# Patient Record
Sex: FEMALE
[De-identification: names, ages, dates, MRNs, and addresses within clinical notes are randomized per-mention and may not be internally consistent; named-entity substitution may affect disease eponyms.]

---

## 2020-07-06 ENCOUNTER — RX ONLY (OUTPATIENT)
Age: 30
Setting detail: RX ONLY
End: 2020-07-06

## 2020-07-06 RX ORDER — HYDROCORTISONE 25 MG/G
OINTMENT TOPICAL
Qty: 1 | Refills: 1 | Status: CANCELLED
Stop reason: CLARIF

## 2024-02-23 ENCOUNTER — HOSPITAL ENCOUNTER (EMERGENCY)
Facility: HOSPITAL | Age: 34
Discharge: HOME | End: 2024-02-23
Attending: EMERGENCY MEDICINE
Payer: COMMERCIAL

## 2024-02-23 VITALS
DIASTOLIC BLOOD PRESSURE: 79 MMHG | HEART RATE: 91 BPM | WEIGHT: 130 LBS | SYSTOLIC BLOOD PRESSURE: 122 MMHG | TEMPERATURE: 98.2 F | OXYGEN SATURATION: 99 % | RESPIRATION RATE: 16 BRPM | HEIGHT: 66 IN | BODY MASS INDEX: 20.89 KG/M2

## 2024-02-23 DIAGNOSIS — R10.9 FLANK PAIN: Primary | ICD-10-CM

## 2024-02-23 LAB
ANION GAP SERPL CALC-SCNC: 6 MEQ/L (ref 3–15)
B-HCG UR QL: NEGATIVE
BASOPHILS # BLD: 0.02 K/UL (ref 0.01–0.1)
BASOPHILS NFR BLD: 0.3 %
BILIRUB UR QL STRIP.AUTO: NEGATIVE MG/DL
BUN SERPL-MCNC: 10 MG/DL (ref 7–25)
CALCIUM SERPL-MCNC: 9.2 MG/DL (ref 8.6–10.3)
CHLORIDE SERPL-SCNC: 103 MEQ/L (ref 98–107)
CLARITY UR REFRACT.AUTO: CLEAR
CO2 SERPL-SCNC: 25 MEQ/L (ref 21–31)
COLOR UR AUTO: COLORLESS
CREAT SERPL-MCNC: 0.8 MG/DL (ref 0.6–1.2)
DIFFERENTIAL METHOD BLD: ABNORMAL
EGFRCR SERPLBLD CKD-EPI 2021: >60 ML/MIN/1.73M*2
EOSINOPHIL # BLD: 0.04 K/UL (ref 0.04–0.36)
EOSINOPHIL NFR BLD: 0.6 %
ERYTHROCYTE [DISTWIDTH] IN BLOOD BY AUTOMATED COUNT: 12.5 % (ref 11.7–14.4)
GLUCOSE SERPL-MCNC: 90 MG/DL (ref 70–99)
GLUCOSE UR STRIP.AUTO-MCNC: NEGATIVE MG/DL
HCT VFR BLD AUTO: 37.2 % (ref 35–45)
HGB BLD-MCNC: 12.5 G/DL (ref 11.8–15.7)
HGB UR QL STRIP.AUTO: NEGATIVE
IMM GRANULOCYTES # BLD AUTO: 0.02 K/UL (ref 0–0.08)
IMM GRANULOCYTES NFR BLD AUTO: 0.3 %
KETONES UR STRIP.AUTO-MCNC: NEGATIVE MG/DL
LEUKOCYTE ESTERASE UR QL STRIP.AUTO: NEGATIVE
LYMPHOCYTES # BLD: 1.2 K/UL (ref 1.2–3.5)
LYMPHOCYTES NFR BLD: 17.3 %
MCH RBC QN AUTO: 29.8 PG (ref 28–33.2)
MCHC RBC AUTO-ENTMCNC: 33.6 G/DL (ref 32.2–35.5)
MCV RBC AUTO: 88.8 FL (ref 83–98)
MONOCYTES # BLD: 0.64 K/UL (ref 0.28–0.8)
MONOCYTES NFR BLD: 9.2 %
NEUTROPHILS # BLD: 5.03 K/UL (ref 1.7–7)
NEUTS SEG NFR BLD: 72.3 %
NITRITE UR QL STRIP.AUTO: NEGATIVE
NRBC BLD-RTO: 0 %
PDW BLD AUTO: 9.3 FL (ref 9.4–12.3)
PH UR STRIP.AUTO: 6 [PH]
PLATELET # BLD AUTO: 224 K/UL (ref 150–369)
POCT TEST: NORMAL
POTASSIUM SERPL-SCNC: 3.9 MEQ/L (ref 3.5–5.1)
PROT UR QL STRIP.AUTO: NEGATIVE
RBC # BLD AUTO: 4.19 M/UL (ref 3.93–5.22)
SODIUM SERPL-SCNC: 134 MEQ/L (ref 136–145)
SP GR UR REFRACT.AUTO: 1.01
UROBILINOGEN UR STRIP-ACNC: 0.2 EU/DL
WBC # BLD AUTO: 6.95 K/UL (ref 3.8–10.5)

## 2024-02-23 PROCEDURE — 81003 URINALYSIS AUTO W/O SCOPE: CPT | Performed by: EMERGENCY MEDICINE

## 2024-02-23 PROCEDURE — 36415 COLL VENOUS BLD VENIPUNCTURE: CPT | Performed by: PHYSICIAN ASSISTANT

## 2024-02-23 PROCEDURE — 80048 BASIC METABOLIC PNL TOTAL CA: CPT | Performed by: PHYSICIAN ASSISTANT

## 2024-02-23 PROCEDURE — 81003 URINALYSIS AUTO W/O SCOPE: CPT

## 2024-02-23 PROCEDURE — 85025 COMPLETE CBC W/AUTO DIFF WBC: CPT | Performed by: PHYSICIAN ASSISTANT

## 2024-02-23 PROCEDURE — 99283 EMERGENCY DEPT VISIT LOW MDM: CPT | Mod: U5

## 2024-02-23 NOTE — ED PROVIDER NOTES
"Emergency Medicine Note  HPI   HISTORY OF PRESENT ILLNESS     Prior records reviewed from [facility]. History obtained by the patient     Ms. Parson is a 33 y.o. female who presents to the ER with a chief complaint of right flank pain x 1 day.  Patient states that proximately a week ago she was prescribed a course of antibiotics after complaining of urinary frequency to her PCP.  Patient states that 2 days ago she began to experience fever chills and diaphoresis throughout the night with urinary frequency.  Patient states that the next day she felt \"much better\".  Patient states that the pain was transient and felt as if she \"just could not get comfortable to go to sleep\".  Patient states that today she was completely asymptomatic however earlier in the day she experienced a tenderness episode of \"cramping\" sensation present in her right flank region.  Patient is currently asymptomatic.    Patient denies hematuria, dysuria, decreased appetite, nausea, vomiting or diarrhea.              Patient History   PAST HISTORY     Reviewed from Nursing Triage:       No past medical history on file.    No past surgical history on file.    No family history on file.           Review of Systems   REVIEW OF SYSTEMS     Review of Systems Pertinent ROS reviewed in HPI, otherwise negative.        VITALS     ED Vitals    Date/Time Temp Pulse Resp BP SpO2 [facility]   02/23/24 2022 -- 91 16 122/79 99 % NKL   02/23/24 1759 36.8 °C (98.2 °F) 100 17 128/82 99 % NN        Pulse Ox %: 99 % (02/23/24 1832)  Pulse Ox Interpretation: Normal (02/23/24 1832)  Heart Rate: 100 (02/23/24 1832)        Physical Exam   PHYSICAL EXAM     Physical Exam  Vitals and nursing note reviewed.   Constitutional:       Appearance: She is well-developed.   HENT:      Head: Normocephalic and atraumatic.   Eyes:      Conjunctiva/sclera: Conjunctivae normal.   Cardiovascular:      Rate and Rhythm: Normal rate and regular rhythm.   Pulmonary:      Effort: Pulmonary effort is " normal.      Breath sounds: Normal breath sounds.   Abdominal:      General: There is no distension.      Palpations: Abdomen is soft. There is no mass.      Tenderness: There is no abdominal tenderness.   Musculoskeletal:         General: No tenderness or deformity. Normal range of motion.      Cervical back: Normal range of motion.   Skin:     General: Skin is warm and dry.   Neurological:      Mental Status: She is alert. Mental status is at baseline.   Psychiatric:         Behavior: Behavior normal.           PROCEDURES     Procedures     DATA     Results     Procedure Component Value Units Date/Time    Basic metabolic panel [[id number]]  (Abnormal) Collected: 02/23/24 1855    Specimen: Blood, Venous Updated: 02/23/24 1959     Sodium 134 mEQ/L      Potassium 3.9 mEQ/L      Comment: Results obtained on plasma. Plasma Potassium values may be up to 0.4 mEQ/L less than serum values. The differences may be greater for patients with high platelet or white cell counts.        Chloride 103 mEQ/L      CO2 25 mEQ/L      BUN 10 mg/dL      Creatinine 0.8 mg/dL      Glucose 90 mg/dL      Calcium 9.2 mg/dL      eGFR >60.0 mL/min/1.73m*2      Comment: Calculation based on the Chronic Kidney Disease Epidemiology Collaboration (CKD-EPI) equation refit without adjustment for race.        Anion Gap 6 mEQ/L     CBC and differential [[id number]]  (Abnormal) Collected: 02/23/24 1855    Specimen: Blood, Venous Updated: 02/23/24 1937     WBC 6.95 K/uL      RBC 4.19 M/uL      Hemoglobin 12.5 g/dL      Hematocrit 37.2 %      MCV 88.8 fL      MCH 29.8 pg      MCHC 33.6 g/dL      RDW 12.5 %      Platelets 224 K/uL      MPV 9.3 fL      Differential Type Auto     nRBC 0.0 %      Immature Granulocytes 0.3 %      Neutrophils 72.3 %      Lymphocytes 17.3 %      Monocytes 9.2 %      Eosinophils 0.6 %      Basophils 0.3 %      Immature Granulocytes, Absolute 0.02 K/uL      Neutrophils, Absolute 5.03 K/uL      Lymphocytes, Absolute 1.20 K/uL       Monocytes, Absolute 0.64 K/uL      Eosinophils, Absolute 0.04 K/uL      Basophils, Absolute 0.02 K/uL     Urinalysis with Reflex Culture [[id number]]  (Normal) Collected: 02/23/24 1805    Specimen: Urine, Clean Catch Updated: 02/23/24 1838    Narrative:      The following orders were created for panel order Urinalysis with Reflex Culture.  Procedure                               Abnormality         Status                     ---------                               -----------         ------                     UA Reflex to Culture (Ma...[[id number]]  Normal              Final result                 Please view results for these tests on the individual orders.    UA Reflex to Culture (Macroscopic) [[id number]]  (Normal) Collected: 02/23/24 1805    Specimen: Urine, Clean Catch Updated: 02/23/24 1838     Color, Urine Colorless     Clarity, Urine Clear     Specific Gravity, Urine 1.007     pH, Urine 6.0     Leukocyte Esterase Negative     Comment: Results can be falsely negative due to high specific gravity, some antibiotics, glucose >3 g/dl, or WBC other than neutrophils.        Nitrite, Urine Negative     Protein, Urine Negative     Glucose, Urine Negative mg/dL      Ketones, Urine Negative mg/dL      Urobilinogen, Urine 0.2 EU/dL      Bilirubin, Urine Negative mg/dL      Blood, Urine Negative     Comment: The sensitivity of the occult blood test is equivalent to approximately 4 intact RBC/HPF.             Imaging Results    None         No orders to display       Scoring tools                                  ED Course & MDM   MDM / ED COURSE / CLINICAL IMPRESSION / DISPO     Medical Decision Making  33-year-old female presents to the ED with a chief complaint of right flank pain.  Differential includes but not limited to kidney stone, musculoskeletal, UTI/pyelonephritis.  At this time patient's symptoms likely consistent with a kidney stone that passed.   urine and laboratory results negative for infection or  hematuria.  Results discussed with the patient at bedside.  Patient verbalized understanding.  Patient instructed to continue her use of Keflex that was provided by her PCP for possible partially treated UTI.  At this time the patient is currently asymptomatic.    Return and follow up precautions discussed with the patient at bedside and pt verbalized understanding.      Flank pain: acute illness or injury  Amount and/or Complexity of Data Reviewed  External Data Reviewed: notes.     Details:  I reviewed the GI note from 4/21/2023 where patient was evaluated for hemorrhoids/blood in the stool.  Patient was also evaluated for IBS  Labs: ordered. Decision-making details documented in ED Course.          ED Course as of 02/27/24 1758 Fri Feb 23, 2024 2011 CBC and differential(!) [BC]   2011 Basic metabolic panel(!) [BC]   2011 Urinalysis with Reflex Culture [BC]      ED Course User Index  [BC] Phillip Horner PA C     Clinical Impression      Flank pain     _________________     ED Disposition   Discharge                   Phillip Horner PA C  02/27/24 1758

## 2024-02-24 NOTE — DISCHARGE INSTRUCTIONS
Please follow up as directed to obtain your results and review your plan of care. CALL your primary doctor's office today to schedule a follow up appointment within the next week    REVIEW AND DISCUSS ALL OF YOUR MEDICATIONS WITH YOUR PRIMARY CARE TEAM WITHIN THE NEXT 2 DAYS, even ones that you may have been on for a long time.    Please call or visit your primary doctor or return to this Emergency Department (or the closest Emergency Department) if you have new concerns, if you are not getting better, or if  you feel that you are getting worse.    Thank you and good luck in your recovery!

## 2024-02-24 NOTE — ED ATTESTATION NOTE
The patient was evaluated and managed by the physician assistant.    Discussed with PA.  Agree with plan.    Mihir (Ed) MD Susan Luna Henry (Ed) MD Guillermo  02/23/24 2005